# Patient Record
Sex: FEMALE | Race: WHITE | URBAN - METROPOLITAN AREA
[De-identification: names, ages, dates, MRNs, and addresses within clinical notes are randomized per-mention and may not be internally consistent; named-entity substitution may affect disease eponyms.]

---

## 2019-03-28 ENCOUNTER — ALLIED HEALTH/NURSE VISIT (OUTPATIENT)
Dept: FAMILY MEDICINE | Facility: OTHER | Age: 39
End: 2019-03-28
Payer: COMMERCIAL

## 2019-03-28 VITALS — SYSTOLIC BLOOD PRESSURE: 142 MMHG | DIASTOLIC BLOOD PRESSURE: 100 MMHG | HEART RATE: 60 BPM

## 2019-03-28 DIAGNOSIS — Z01.30 BP CHECK: Primary | ICD-10-CM

## 2019-03-28 PROCEDURE — 99207 ZZC NO CHARGE NURSE ONLY: CPT

## 2019-03-28 NOTE — PROGRESS NOTES
Laura Parrish is a 39 year old patient who comes in today for a Blood Pressure check.  Initial BP:  BP (!) 142/100   Pulse 60      60  Disposition: provider notified while patient in the clinic    Patient is feeling lightheaded and dizzy, states this has been going on since Sunday. Was seen by primary on [employer] on 3/25 and was diagnosed with vertigo. She states that she will mychart her provider to see what she suggests. Huddled with [facility], patient can take benadryl to help with dizziness and lightheadedness if due to a cold if she would like. Patient will need to make an appointment with her PCP or another provider in clinic as soon as she can to discuss high blood pressure. Patient made appointment for tomorrow morning at 7:40am

## 2019-03-29 ENCOUNTER — OFFICE VISIT (OUTPATIENT)
Dept: FAMILY MEDICINE | Facility: OTHER | Age: 39
End: 2019-03-29
Payer: COMMERCIAL

## 2019-03-29 VITALS
DIASTOLIC BLOOD PRESSURE: 84 MMHG | BODY MASS INDEX: 27.23 KG/M2 | OXYGEN SATURATION: 97 % | HEART RATE: 72 BPM | HEIGHT: 62 IN | WEIGHT: 148 LBS | TEMPERATURE: 98.2 F | RESPIRATION RATE: 16 BRPM | SYSTOLIC BLOOD PRESSURE: 124 MMHG

## 2019-03-29 DIAGNOSIS — H81.10 BENIGN PAROXYSMAL POSITIONAL VERTIGO, UNSPECIFIED LATERALITY: Primary | ICD-10-CM

## 2019-03-29 PROCEDURE — 99204 OFFICE O/P NEW MOD 45 MIN: CPT | Performed by: PHYSICIAN ASSISTANT

## 2019-03-29 RX ORDER — LEVOTHYROXINE SODIUM 125 UG/1
125 TABLET ORAL
COMMUNITY
Start: 2019-02-06

## 2019-03-29 ASSESSMENT — MIFFLIN-ST. JEOR: SCORE: 1291.63

## 2019-03-29 ASSESSMENT — PAIN SCALES - GENERAL: PAINLEVEL: NO PAIN (0)

## 2019-03-29 NOTE — PROGRESS NOTES
SUBJECTIVE:   Laura Parrish is a 39 year old female who presents to clinic today for the following health issues:      HPI   Dizziness and high blood pressure    Patient presents today to recheck dizziness and blood pressure. She reports symptoms started about 5 days ago. She reports extreme dizziness with changing positions. It is particularly bad when she lays down in bed and gets up in the morning. She reports yesterday she was cleaning a house - looking up and symptoms started again. She was seen at [facility] on 3/25/19. Her blood pressure was 150/92 at that time. She was told to follow-up for recheck. She denies headaches, vision changes, chest pain, shortness of breath, paresthesias or lower extremity swelling. Does not routinely exercise and eats quite a bit of fast food. She did quit smoking. Younger brother treated for HTN. No other family history of CAD disease.     Problem list and histories reviewed & adjusted, as indicated.  Additional history: as documented    Patient Active Problem List   Diagnosis     CARDIOVASCULAR SCREENING; LDL GOAL LESS THAN 160     Hypothyroidism     No past surgical history on file.    Social History     Tobacco Use     Smoking status: Former Smoker     Packs/day: 1.00     Types: Cigarettes     Smokeless tobacco: Never Used   Substance Use Topics     Alcohol use: Yes     Family History   Problem Relation Age of Onset     Hypertension Brother          Current Outpatient Medications   Medication Sig Dispense Refill     levothyroxine (SYNTHROID/LEVOTHROID) 125 MCG tablet Take 125 mcg by mouth       Allergies   Allergen Reactions     Amoxicillin      Ciprofloxacin Rash     BP Readings from Last 3 Encounters:   03/29/19 124/84   03/28/19 (!) 142/100   04/28/14 (!) 133/93    Wt Readings from Last 3 Encounters:   03/29/19 67.1 kg (148 lb)   04/01/10 61.7 kg (136 lb)   04/09/09 64 kg (141 lb)        ROS:  Constitutional, HEENT, cardiovascular, pulmonary, GI, , musculoskeletal, neuro,  "skin, endocrine and psych systems are negative, except as otherwise noted.    OBJECTIVE:     /84   Pulse 72   Temp 98.2  F (36.8  C)   Resp 16   Ht 1.562 m (5' 1.5\")   Wt 67.1 kg (148 lb)   LMP 03/17/2019 (Approximate)   SpO2 97%   BMI 27.51 kg/m    Body mass index is 27.51 kg/m .  GENERAL: healthy, alert and no distress  EYES: Eyes grossly normal to inspection, PERRL and conjunctivae and sclerae normal  HENT: ear canals and TM's normal, nose and mouth without ulcers or lesions  NECK: no adenopathy, no asymmetry, masses, or scars and thyroid normal to palpation  RESP: lungs clear to auscultation - no rales, rhonchi or wheezes  CV: regular rate and rhythm, normal S1 S2, no S3 or S4, no murmur, click or rub, no peripheral edema and peripheral pulses strong  ABDOMEN: soft, nontender, no hepatosplenomegaly, no masses and bowel sounds normal  SKIN: no suspicious lesions or rashes  NEURO: Normal strength and tone, sensory exam grossly normal, proprioception normal, mentation intact, speech normal and cranial nerves 2-12 intact  PSYCH: mentation appears normal, affect normal/bright    Diagnostic Test Results:  none     ASSESSMENT/PLAN:     1. Benign paroxysmal positional vertigo, unspecified laterality  Blood pressure is well controlled today. Symptoms very consistent with BPPV. Referral placed to PT at this time. We discussed monitoring of blood pressure and diet/exercise changes. Patient will follow-up if symptoms persist or new symptoms develop.   - PHYSICAL THERAPY REFERRAL; Future    The patient indicates understanding of these issues and agrees with the plan.    Maggi Montaño PA-C  [facility]  "

## 2019-06-27 ENCOUNTER — APPOINTMENT (OUTPATIENT)
Dept: CT IMAGING | Facility: CLINIC | Age: 39
End: 2019-06-27
Attending: PHYSICIAN ASSISTANT
Payer: COMMERCIAL

## 2019-06-27 ENCOUNTER — HOSPITAL ENCOUNTER (EMERGENCY)
Facility: CLINIC | Age: 39
Discharge: HOME | End: 2019-06-27
Attending: PHYSICIAN ASSISTANT | Admitting: PHYSICIAN ASSISTANT
Payer: COMMERCIAL

## 2019-06-27 VITALS
WEIGHT: 155 LBS | DIASTOLIC BLOOD PRESSURE: 91 MMHG | SYSTOLIC BLOOD PRESSURE: 134 MMHG | RESPIRATION RATE: 15 BRPM | HEART RATE: 78 BPM | BODY MASS INDEX: 28.81 KG/M2 | TEMPERATURE: 98.1 F | OXYGEN SATURATION: 98 %

## 2019-06-27 DIAGNOSIS — E86.0 DEHYDRATION: ICD-10-CM

## 2019-06-27 DIAGNOSIS — S01.01XA SCALP LACERATION: ICD-10-CM

## 2019-06-27 DIAGNOSIS — R55 SYNCOPE: ICD-10-CM

## 2019-06-27 DIAGNOSIS — S09.8XXA BLUNT HEAD TRAUMA: ICD-10-CM

## 2019-06-27 LAB
ALBUMIN SERPL-MCNC: 3.9 G/DL (ref 3.4–5)
ALBUMIN UR-MCNC: NEGATIVE MG/DL
ALP SERPL-CCNC: 50 U/L (ref 40–150)
ALT SERPL W P-5'-P-CCNC: 20 U/L (ref 0–50)
AMPHETAMINES UR QL: NOT DETECTED NG/ML
ANION GAP SERPL CALCULATED.3IONS-SCNC: 6 MMOL/L (ref 3–14)
APPEARANCE UR: ABNORMAL
AST SERPL W P-5'-P-CCNC: 15 U/L (ref 0–45)
BARBITURATES UR QL SCN: NOT DETECTED NG/ML
BASOPHILS # BLD AUTO: 0.1 10E9/L (ref 0–0.2)
BASOPHILS NFR BLD AUTO: 0.9 %
BENZODIAZ UR QL SCN: NOT DETECTED NG/ML
BILIRUB SERPL-MCNC: 0.4 MG/DL (ref 0.2–1.3)
BILIRUB UR QL STRIP: NEGATIVE
BUN SERPL-MCNC: 10 MG/DL (ref 7–30)
BUPRENORPHINE UR QL: NOT DETECTED NG/ML
CALCIUM SERPL-MCNC: 8.5 MG/DL (ref 8.5–10.1)
CANNABINOIDS UR QL: NOT DETECTED NG/ML
CHLORIDE SERPL-SCNC: 109 MMOL/L (ref 94–109)
CO2 SERPL-SCNC: 25 MMOL/L (ref 20–32)
COCAINE UR QL SCN: NOT DETECTED NG/ML
COLOR UR AUTO: YELLOW
CREAT SERPL-MCNC: 0.85 MG/DL (ref 0.52–1.04)
D-METHAMPHET UR QL: NOT DETECTED NG/ML
DIFFERENTIAL METHOD BLD: ABNORMAL
EOSINOPHIL NFR BLD AUTO: 1.5 %
ERYTHROCYTE [DISTWIDTH] IN BLOOD BY AUTOMATED COUNT: 12.5 % (ref 10–15)
ETHANOL SERPL-MCNC: <0.01 G/DL
GFR SERPL CREATININE-BSD FRML MDRD: 86 ML/MIN/{1.73_M2}
GLUCOSE SERPL-MCNC: 125 MG/DL (ref 70–99)
GLUCOSE UR STRIP-MCNC: NEGATIVE MG/DL
HCG UR QL: NEGATIVE
HCT VFR BLD AUTO: 39.8 % (ref 35–47)
HGB BLD-MCNC: 13.5 G/DL (ref 11.7–15.7)
HGB UR QL STRIP: ABNORMAL
HYALINE CASTS #/AREA URNS LPF: 1 /LPF (ref 0–2)
IMM GRANULOCYTES # BLD: 0.1 10E9/L (ref 0–0.4)
IMM GRANULOCYTES NFR BLD: 0.9 %
KETONES UR STRIP-MCNC: 20 MG/DL
LEUKOCYTE ESTERASE UR QL STRIP: NEGATIVE
LIPASE SERPL-CCNC: 70 U/L (ref 73–393)
LYMPHOCYTES # BLD AUTO: 1.8 10E9/L (ref 0.8–5.3)
LYMPHOCYTES NFR BLD AUTO: 18.6 %
MCH RBC QN AUTO: 33.4 PG (ref 26.5–33)
MCHC RBC AUTO-ENTMCNC: 33.9 G/DL (ref 31.5–36.5)
MCV RBC AUTO: 99 FL (ref 78–100)
METHADONE UR QL SCN: NOT DETECTED NG/ML
MONOCYTES # BLD AUTO: 0.6 10E9/L (ref 0–1.3)
MONOCYTES NFR BLD AUTO: 6.5 %
MUCOUS THREADS #/AREA URNS LPF: PRESENT /LPF
NEUTROPHILS # BLD AUTO: 6.9 10E9/L (ref 1.6–8.3)
NEUTROPHILS NFR BLD AUTO: 71.6 %
NITRATE UR QL: NEGATIVE
NRBC # BLD AUTO: 0 10*3/UL
NRBC BLD AUTO-RTO: 0 /100
OPIATES UR QL SCN: NOT DETECTED NG/ML
OXYCODONE UR QL SCN: NOT DETECTED NG/ML
PCP UR QL SCN: NOT DETECTED NG/ML
PH UR STRIP: 5 PH (ref 5–7)
PLATELET # BLD AUTO: 246 10E9/L (ref 150–450)
POTASSIUM SERPL-SCNC: 3.4 MMOL/L (ref 3.4–5.3)
PROPOXYPH UR QL: NOT DETECTED NG/ML
PROT SERPL-MCNC: 6.9 G/DL (ref 6.8–8.8)
RBC # BLD AUTO: 4.04 10E12/L (ref 3.8–5.2)
RBC #/AREA URNS AUTO: 1 /HPF (ref 0–2)
SODIUM SERPL-SCNC: 140 MMOL/L (ref 133–144)
SOURCE: ABNORMAL
SP GR UR STRIP: 1.01 (ref 1–1.03)
SQUAMOUS #/AREA URNS AUTO: 5 /HPF (ref 0–1)
TRICYCLICS UR QL SCN: NOT DETECTED NG/ML
UROBILINOGEN UR STRIP-MCNC: 0 MG/DL (ref 0–2)
WBC # BLD AUTO: 9.7 10E9/L (ref 4–11)
WBC #/AREA URNS AUTO: 6 /HPF (ref 0–5)

## 2019-06-27 PROCEDURE — 81001 URINALYSIS AUTO W/SCOPE: CPT | Performed by: PHYSICIAN ASSISTANT

## 2019-06-27 PROCEDURE — 12001 RPR S/N/AX/GEN/TRNK 2.5CM/<: CPT

## 2019-06-27 PROCEDURE — 96374 THER/PROPH/DIAG INJ IV PUSH: CPT

## 2019-06-27 PROCEDURE — 99285 EMERGENCY DEPT VISIT HI MDM: CPT | Mod: 25

## 2019-06-27 PROCEDURE — 12001 RPR S/N/AX/GEN/TRNK 2.5CM/<: CPT | Mod: Z6 | Performed by: PHYSICIAN ASSISTANT

## 2019-06-27 PROCEDURE — 81025 URINE PREGNANCY TEST: CPT | Performed by: PHYSICIAN ASSISTANT

## 2019-06-27 PROCEDURE — 80306 DRUG TEST PRSMV INSTRMNT: CPT | Performed by: PHYSICIAN ASSISTANT

## 2019-06-27 PROCEDURE — 80053 COMPREHEN METABOLIC PANEL: CPT | Performed by: PHYSICIAN ASSISTANT

## 2019-06-27 PROCEDURE — 25000128 H RX IP 250 OP 636: Performed by: PHYSICIAN ASSISTANT

## 2019-06-27 PROCEDURE — 85025 COMPLETE CBC W/AUTO DIFF WBC: CPT | Performed by: PHYSICIAN ASSISTANT

## 2019-06-27 PROCEDURE — 70450 CT HEAD/BRAIN W/O DYE: CPT

## 2019-06-27 PROCEDURE — 93010 ELECTROCARDIOGRAM REPORT: CPT | Mod: Z6 | Performed by: PHYSICIAN ASSISTANT

## 2019-06-27 PROCEDURE — 80320 DRUG SCREEN QUANTALCOHOLS: CPT | Performed by: PHYSICIAN ASSISTANT

## 2019-06-27 PROCEDURE — 25000125 ZZHC RX 250: Performed by: PHYSICIAN ASSISTANT

## 2019-06-27 PROCEDURE — 83690 ASSAY OF LIPASE: CPT | Performed by: PHYSICIAN ASSISTANT

## 2019-06-27 PROCEDURE — 99285 EMERGENCY DEPT VISIT HI MDM: CPT | Mod: 25 | Performed by: PHYSICIAN ASSISTANT

## 2019-06-27 PROCEDURE — 96361 HYDRATE IV INFUSION ADD-ON: CPT

## 2019-06-27 PROCEDURE — 93005 ELECTROCARDIOGRAM TRACING: CPT

## 2019-06-27 RX ORDER — KETOROLAC TROMETHAMINE 30 MG/ML
30 INJECTION, SOLUTION INTRAMUSCULAR; INTRAVENOUS ONCE
Status: COMPLETED | OUTPATIENT
Start: 2019-06-27 | End: 2019-06-27

## 2019-06-27 RX ORDER — SODIUM CHLORIDE 9 MG/ML
1000 INJECTION, SOLUTION INTRAVENOUS CONTINUOUS
Status: DISCONTINUED | OUTPATIENT
Start: 2019-06-27 | End: 2019-06-27 | Stop reason: HOSPADM

## 2019-06-27 RX ADMIN — SODIUM CHLORIDE 1000 ML: 9 INJECTION, SOLUTION INTRAVENOUS at 14:15

## 2019-06-27 RX ADMIN — KETOROLAC TROMETHAMINE 30 MG: 30 INJECTION, SOLUTION INTRAMUSCULAR at 15:14

## 2019-06-27 RX ADMIN — Medication 3 ML: at 15:01

## 2019-06-27 RX ADMIN — SODIUM CHLORIDE 1000 ML: 9 INJECTION, SOLUTION INTRAVENOUS at 15:30

## 2019-06-27 NOTE — LETTER
June 27, 2019      To Whom It May Concern:      Laura Parrish was seen in our Emergency Department today, 06/27/19.  I expect her condition to improve over the next 5-7 days.  Please excuse her from work until she is released to do so.  She is scheduled for a recheck appointment early next week to re-evaluate her condition.        Sincerely,            Lucian Reed PA-C

## 2019-06-27 NOTE — ED PROVIDER NOTES
"  History     Chief Complaint   Patient presents with     Fall     The history is provided by the EMS personnel and the patient.     Laura Parrish is a 39 year old female who presents to the emergency department via EMS for a fall. Patient is brought to the ED after having an unwitnessed fall at [employer]. Per EMS she was found in the aisle by another customer \"unresponsive and twitching\". She was awake when EMS arrived Patient only remembers going into [employer] and feeling dizzy, doesn't remember falling. She did strike her head and complains of pain in the back of her head, nausea and has an abrasion on her tongue. She denies any loss of bowel or bladder. She denies vomiting blood, black stools, dysuria, night sweats or weight loss. Patient reports having chronic diarrhea 3-4 times a day since her gallbladder was removed. Her LMP was about 3 weeks ago. She does get her menstrual cycle once a month, bleeds for 6 days with no heavy bleeding. She does not drink a lot of water, states \"I hate water\" and eats for the first time during the day around 1300, which is usual for patient. She denies ever having these symptoms or passing out before, history of seizures or heart problems. She reports drinking at least 4 beers a day when she gets home at night. She denies having any alcohol yet today. She denies chance of pregnancy due to having a tubal. She denies any use of street drugs.    Allergies:  Allergies   Allergen Reactions     Amoxicillin      Ciprofloxacin Rash       Problem List:    Patient Active Problem List    Diagnosis Date Noted     Hypothyroidism 07/25/2014     Priority: Medium     CARDIOVASCULAR SCREENING; LDL GOAL LESS THAN 160 10/31/2010     Priority: Medium        Past Medical History:    History reviewed. No pertinent past medical history.    Past Surgical History:    History reviewed. No pertinent surgical history.    Family History:    Family History   Problem Relation Age of Onset     Hypertension " Brother        Social History:  Marital Status:   [2]  Social History     Tobacco Use     Smoking status: Former Smoker     Packs/day: 1.00     Types: Cigarettes     Smokeless tobacco: Never Used   Substance Use Topics     Alcohol use: Yes     Alcohol/week: 2.4 oz     Types: 4 Cans of beer per week     Comment: Last use of ETOH evening.      Drug use: No        Medications:      levothyroxine (SYNTHROID/LEVOTHROID) 125 MCG tablet         Review of Systems   All other systems reviewed and are negative.      Physical Exam   BP: (!) 129/91  Pulse: 87  Heart Rate: 84  Temp: 98.1  F (36.7  C)  Resp: 16  Weight: 70.3 kg (155 lb)(bed scale)  SpO2: 98 %      Physical Exam   Constitutional: She is oriented to person, place, and time. She appears well-developed and well-nourished. No distress.   HENT:   Right Ear: External ear normal.   Left Ear: External ear normal.   Nose: Nose normal.   Mouth/Throat: Oropharynx is clear and moist. No oropharyngeal exudate.   Left parietal aspect of the scalp with a 2.5 cm burst type T-shaped laceration without skull defect palpated.  Tender to palpation.  Some minor bleeding.  Some superficial fat is coming from the wound as well.  Right side of the tongue with evidence for abrasion, but no tongue biting.   Eyes: Pupils are equal, round, and reactive to light. Conjunctivae and EOM are normal. Right eye exhibits no discharge. Left eye exhibits no discharge. No scleral icterus.   Neck: Normal range of motion. Neck supple. No thyromegaly present.   Cardiovascular: Normal rate, regular rhythm, normal heart sounds and intact distal pulses. Exam reveals no friction rub.   No murmur heard.  Pulmonary/Chest: Effort normal and breath sounds normal. No respiratory distress. She has no wheezes. She has no rales. She exhibits no tenderness.   Abdominal: Soft. Bowel sounds are normal. She exhibits no distension and no mass. There is no tenderness. There is no rebound and no guarding.  "  Musculoskeletal: Normal range of motion. She exhibits no edema, tenderness or deformity.   Lymphadenopathy:     She has no cervical adenopathy.   Neurological: She is alert and oriented to person, place, and time.   Neuro:  Cranial nerves II-XII grossly intact.  Romberg is steady.  Gait WNL's.  Cerebellar testing is WNL's.  Sensation is intact to light touch throughout.  Bicep, brachioradialis, patellar, and achilles DTR's 2/4 without clonus.  No neurological abnormality identified.    Skin: Skin is warm and dry. Capillary refill takes less than 2 seconds. No rash noted. She is not diaphoretic. No erythema.   Psychiatric: She has a normal mood and affect. Her behavior is normal. Thought content normal.   Nursing note and vitals reviewed.      ED Course        Laceration repair  Date/Time: 6/28/2019 12:52 AM  Performed by: Lucian Reed PA-C  Authorized by: Lcuian Reed PA-C   Consent: Verbal consent obtained.  Risks and benefits: risks, benefits and alternatives were discussed  Consent given by: patient  Patient identity confirmed: arm band and verbally with patient  Time out: Immediately prior to procedure a \"time out\" was called to verify the correct patient, procedure, equipment, support staff and site/side marked as required.  Body area: head/neck  Location details: scalp  Laceration length: 2.5 cm  Foreign bodies: no foreign bodies  Anesthesia method: LET.    Anesthesia:  Local Anesthetic: LET (lido,epi,tetracaine)  Preparation: Patient was prepped and draped in the usual sterile fashion.  Irrigation solution: saline  Irrigation method: syringe and tap  Amount of cleaning: extensive  Debridement: none  Degree of undermining: none  Skin closure: 4-0 nylon  Number of sutures: 5  Technique: simple  Approximation: close  Approximation difficulty: simple  Dressing: antibiotic ointment  Patient tolerance: Patient tolerated the procedure well with no immediate complications                     EKG " Interpretation:      Interpreted by Lucain Reed  Time reviewed: 1450  Symptoms at time of EKG: none   Rhythm: normal sinus   Rate: normal  Axis: normal  Ectopy: none  Conduction: normal  ST Segments/ T Waves: No ST-T wave changes  Q Waves: none  Comparison to prior: No old EKG available    Clinical Impression: normal EKG          Critical Care time:  none               Results for orders placed or performed during the hospital encounter of 06/27/19 (from the past 24 hour(s))   CBC with platelets differential   Result Value Ref Range    WBC 9.7 4.0 - 11.0 10e9/L    RBC Count 4.04 3.8 - 5.2 10e12/L    Hemoglobin 13.5 11.7 - 15.7 g/dL    Hematocrit 39.8 35.0 - 47.0 %    MCV 99 78 - 100 fl    MCH 33.4 (H) 26.5 - 33.0 pg    MCHC 33.9 31.5 - 36.5 g/dL    RDW 12.5 10.0 - 15.0 %    Platelet Count 246 150 - 450 10e9/L    Diff Method Automated Method     % Neutrophils 71.6 %    % Lymphocytes 18.6 %    % Monocytes 6.5 %    % Eosinophils 1.5 %    % Basophils 0.9 %    % Immature Granulocytes 0.9 %    Nucleated RBCs 0 0 /100    Absolute Neutrophil 6.9 1.6 - 8.3 10e9/L    Absolute Lymphocytes 1.8 0.8 - 5.3 10e9/L    Absolute Monocytes 0.6 0.0 - 1.3 10e9/L    Absolute Basophils 0.1 0.0 - 0.2 10e9/L    Abs Immature Granulocytes 0.1 0 - 0.4 10e9/L    Absolute Nucleated RBC 0.0    Comprehensive metabolic panel   Result Value Ref Range    Sodium 140 133 - 144 mmol/L    Potassium 3.4 3.4 - 5.3 mmol/L    Chloride 109 94 - 109 mmol/L    Carbon Dioxide 25 20 - 32 mmol/L    Anion Gap 6 3 - 14 mmol/L    Glucose 125 (H) 70 - 99 mg/dL    Urea Nitrogen 10 7 - 30 mg/dL    Creatinine 0.85 0.52 - 1.04 mg/dL    GFR Estimate 86 >60 mL/min/[1.73_m2]    GFR Estimate If Black >90 >60 mL/min/[1.73_m2]    Calcium 8.5 8.5 - 10.1 mg/dL    Bilirubin Total 0.4 0.2 - 1.3 mg/dL    Albumin 3.9 3.4 - 5.0 g/dL    Protein Total 6.9 6.8 - 8.8 g/dL    Alkaline Phosphatase 50 40 - 150 U/L    ALT 20 0 - 50 U/L    AST 15 0 - 45 U/L   Lipase   Result Value Ref  Range    Lipase 70 (L) 73 - 393 U/L   Alcohol ethyl   Result Value Ref Range    Ethanol g/dL <0.01 <0.01 g/dL   CT Head w/o Contrast    Narrative    CT SCAN OF THE HEAD WITHOUT CONTRAST   6/27/2019 2:35 PM     HISTORY: Syncope versus seizure, posterior head trauma and pain.    TECHNIQUE: Axial images of the head and coronal reformations without  IV contrast material. Radiation dose for this scan was reduced using  automated exposure control, adjustment of the mA and/or kV according  to patient size, or iterative reconstruction technique.    COMPARISON: None.    FINDINGS: There is some extracalvarial soft tissue swelling over the  left lateral anterior parietal region. There is no evidence for any  underlying fracture or any intracranial hemorrhage. Ventricles and  subarachnoid spaces are normal. Brain parenchyma is normal. There is  no evidence for acute infarct or mass effect. Moderate mucosal  thickening is noted in the right maxillary sinus. The mastoid air  cells are clear.      Impression    IMPRESSION: Left lateral anterior parietal scalp soft tissue swelling.  No evidence for any underlying fracture or any intracranial  hemorrhage.    STEPHANIE GOODMAN MD   HCG qualitative urine (UPT)   Result Value Ref Range    HCG Qual Urine Negative NEG^Negative   UA reflex to Microscopic and Culture   Result Value Ref Range    Color Urine Yellow     Appearance Urine Slightly Cloudy     Glucose Urine Negative NEG^Negative mg/dL    Bilirubin Urine Negative NEG^Negative    Ketones Urine 20 (A) NEG^Negative mg/dL    Specific Gravity Urine 1.013 1.003 - 1.035    Blood Urine Small (A) NEG^Negative    pH Urine 5.0 5.0 - 7.0 pH    Protein Albumin Urine Negative NEG^Negative mg/dL    Urobilinogen mg/dL 0.0 0.0 - 2.0 mg/dL    Nitrite Urine Negative NEG^Negative    Leukocyte Esterase Urine Negative NEG^Negative    Source Unspecified Urine     RBC Urine 1 0 - 2 /HPF    WBC Urine 6 (H) 0 - 5 /HPF    Squamous Epithelial /HPF Urine 5 (H) 0 -  1 /HPF    Mucous Urine Present (A) NEG^Negative /LPF    Hyaline Casts 1 0 - 2 /LPF   Urine Drugs of Abuse Screen Panel 13   Result Value Ref Range    Cannabinoids (35-odq-3-carboxy-9-THC) Not Detected NDET^Not Detected ng/mL    Phencyclidine (Phencyclidine) Not Detected NDET^Not Detected ng/mL    Cocaine (Benzoylecgonine) Not Detected NDET^Not Detected ng/mL    Methamphetamine (d-Methamphetamine) Not Detected NDET^Not Detected ng/mL    Opiates (Morphine) Not Detected NDET^Not Detected ng/mL    Amphetamine (d-Amphetamine) Not Detected NDET^Not Detected ng/mL    Benzodiazepines (Nordiazepam) Not Detected NDET^Not Detected ng/mL    Tricyclic Antidepressants (Desipramine) Not Detected NDET^Not Detected ng/mL    Methadone (Methadone) Not Detected NDET^Not Detected ng/mL    Barbiturates (Butalbital) Not Detected NDET^Not Detected ng/mL    Oxycodone (Oxycodone) Not Detected NDET^Not Detected ng/mL    Propoxyphene (Norpropoxyphene) Not Detected NDET^Not Detected ng/mL    Buprenorphine (Buprenorphine) Not Detected NDET^Not Detected ng/mL       Medications   0.9% sodium chloride BOLUS (0 mLs Intravenous Stopped 6/27/19 1518)   lidocaine/EPINEPHrine/tetracaine (LET) solution KIT 3 mL (3 mLs Topical Given 6/27/19 1501)   ketorolac (TORADOL) injection 30 mg (30 mg Intravenous Given 6/27/19 1514)   0.9% sodium chloride BOLUS (0 mLs Intravenous Stopped 6/27/19 1641)       Assessments & Plan (with Medical Decision Making)     Syncope  Scalp laceration  Blunt head trauma  Dehydration     39 year old female presents for evaluation of a syncopal event that happened at a local grocery store.  Patient does not recall the event, but does remember waking up.  Bystanders noted that the patient fell to the floor and started to twitch a little bit, but was arousable afterwards.  There is no loss of bowel/bladder function.  No tonic-clonic movements.  EMS was contacted.  Blood sugar was not done in the field.  She is brought to the ED for  evaluation.  Patient denies ever having this happen in the past.  She denies any palpitations, lightheadedness, fatigue, or other symptoms preceding the incident.  She generally drinks 1 to 2 cups of coffee every morning, but does not eat anything until late afternoon.  Her first nourishment is at about 3-4 PM.  She does this as she does not want to have a bowel movement when she is at work.  She has had troubles with dumping syndrome ever since having her cholecystectomy.  At the time of her ED presentation, the patient notes left-sided upper scalp discomfort and a headache, but no other symptoms.  She notes some soreness to the right lateral aspect of her tongue.  On exam blood pressure 134/91, pulse 78, temperature 98.1, and oxygen saturation 98% on room air.  Patient has a 2.5 cm T-shaped burst type laceration to the left parietal aspect of the scalp without skull depression or other abnormality.  Neurological exam completely intact.  She does not appear to be postictal.  Right lateral tongue with an abrasion but no active bleeding or sign of laceration.  Remainder the exam completely normal.  EKG done upon arrival with no acute ST or T wave change.  No preexcitation.  No prior EKG for comparison.  IV was established and she was given 2 L of IV normal saline for rehydration purposes.  She did not urinate to the very end of her visit, and therefore I think that dehydration certainly plays a role here.  Labs with normal CBC.  Hemoglobin stable at 13.5, white blood cell count 9700, and platelet level normal at 246,000.  Comprehensive metabolic panel with a blood sugar of 125, but other levels are completely normal.  EtOH level undetectable.  hCG negative and urinalysis without evidence for infection.  She had small blood on microscopic exam but only 1 red cell on microscopic exam.  Squamous epithelial cells noted.  This appears to represent a contaminated specimen.  Urine tox screen negative.  CT of the head  without evidence for skull fracture or intracranial bleeding.  I reviewed the CT as well especially given the extent of the laceration noted.  I do not see evidence for skull fracture either.  I attempted stapling of the wound, but this did not approximate the wound edges well.  Therefore, I remove the one staple and then placed #five 4-0 Ethilon sutures with good approximation of the wound edges.  Long tags were left on the sutures to allow for easier suture removal.   bacitracin ointment applied.  Wound care measures discussed.  Patient's symptoms do not appear to be consistent with seizure disorder.  She did not have any tonic-clonic movements or loss of bowel/bladder unction.  It does not appear that she had a true postictal state either.  A blood sugar was not done on scene, and we did not get a blood sugar done until almost 1.5 hours after the incident.  I am concerned that hypoglycemia could have been an issue with her 2 cups of coffee intake and no nourishment this morning.  We had a long conversation about proper hydration measures and also getting some calories in her body in the morning.  I provided some ideas to provide this nourishment yet not cause diarrhea given her dumping syndrome.  Given her level of blunt head trauma, I am concerned that she could also have suffered a concussion.  To play it safe, I am going to take her out of work, physical activity, watching TV, reading books, etc. until released to do so.  Appointment set up with sports medicine in 5 days.  Note provided for work.  Importance of proper hydration reviewed.  Strict return instructions reviewed with the patient and her .  She was in agreement with this plan and was suitable for discharge in much improved condition.     I have reviewed the nursing notes.    I have reviewed the findings, diagnosis, plan and need for follow up with the patient.          Medication List      There are no discharge medications for this visit.          Final diagnoses:   Syncope   Scalp laceration   Blunt head trauma   Dehydration     This document serves as a record of services personally performed by Lucian Reed PA-C. It was created on their behalf by Roberta Christie, a trained medical scribe. The creation of this record is based on the provider's personal observations and the statements of the patient. This document has been checked and approved by the attending provider.    Note: Chart documentation done in part with Dragon Voice Recognition software. Although reviewed after completion, some word and grammatical errors may remain.    6/27/2019   Lucian Reed PA-C   [facility] EMERGENCY DEPARTMENT     Lucian Reed PA-C  06/28/19 0100

## 2019-06-27 NOTE — DISCHARGE INSTRUCTIONS
It was a pleasure working with you today!  I hope your condition improves rapidly!     Thankfully, your work-up returned within normal limits.  I am concerned that you likely suffered a concussion given your blunt head trauma.  For that reason, I do not want to working, exercising, watching TV, reading books, etc. until released to do so.  Please attend your recheck appointment prior to returning to work to ensure that it is safe to do so.  Please make sure that you stay hydrated like we discussed.  Please work on providing some nourishment in the morning.  Consider a shake or something simple to at least get something into your system.  I am concerned that you maybe had a low blood sugar event today.  Your stitches in the scalp will need to be removed in 8 days.

## 2019-06-27 NOTE — ED AVS SNAPSHOT
[facility] Emergency Department  [address] [place] [address]  Phone:  [phone]  Fax:  [phone]                                    Laura Parrish   MRN: [id number]    Department:  [facility] Emergency Department   Date of Visit:  6/27/2019           After Visit Summary Signature Page    I have received my discharge instructions, and my questions have been answered. I have discussed any challenges I see with this plan with the nurse or doctor.    ..........................................................................................................................................  Patient/Patient Representative Signature      ..........................................................................................................................................  Patient Representative Print Name and Relationship to Patient    ..................................................               ................................................  Date                                   Time    ..........................................................................................................................................  Reviewed by Signature/Title    ...................................................              ..............................................  Date                                               Time          22EPIC Rev 08/18

## 2019-06-27 NOTE — ED TRIAGE NOTES
"Patient brought to ED via EMS from [facility] after having an unwitnessed fall. She was found in the aisle by another customer \"unresponsive\". María Pineda RN  "

## 2019-06-27 NOTE — ED NOTES
"Patient reports daily drinking \"a lot\". She states she drinks at least 4 beers a day and has had none since last evening. María Pineda RN  "

## 2019-07-01 ENCOUNTER — OFFICE VISIT (OUTPATIENT)
Dept: ORTHOPEDICS | Facility: OTHER | Age: 39
End: 2019-07-01
Payer: COMMERCIAL

## 2019-07-01 VITALS
HEIGHT: 62 IN | BODY MASS INDEX: 28.52 KG/M2 | DIASTOLIC BLOOD PRESSURE: 94 MMHG | SYSTOLIC BLOOD PRESSURE: 144 MMHG | WEIGHT: 155 LBS

## 2019-07-01 DIAGNOSIS — S09.90XD CLOSED HEAD INJURY, SUBSEQUENT ENCOUNTER: Primary | ICD-10-CM

## 2019-07-01 DIAGNOSIS — R42 VERTIGO: ICD-10-CM

## 2019-07-01 DIAGNOSIS — R55 SYNCOPE AND COLLAPSE: ICD-10-CM

## 2019-07-01 PROCEDURE — 99204 OFFICE O/P NEW MOD 45 MIN: CPT | Performed by: PHYSICAL MEDICINE & REHABILITATION

## 2019-07-01 ASSESSMENT — MIFFLIN-ST. JEOR: SCORE: 1323.39

## 2019-07-01 NOTE — LETTER
7/1/2019         RE: Laura Parrish  [id number] [facility] [address]        Dear Colleague,    Thank you for referring your patient, Laura Parrish, to the [facility]. Please see a copy of my visit note below.    Sports Medicine Clinic Report:    CC: Head Injury    SUBJECTIVE:  Laura Parrish is a 39 year old female who is seen as an ER referral for evaluation of a possible concussion that occurred 6/27/19 or 4 days ago.   Mechanism of injury: fainted at [employer] and hit the back of her head. She does not remember falling but remembers right before the fall having a change in vision. She then remembers waking up and a  being in front of her on the phone. She was transported by EMS and had stitches in her head.   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    Since your injury, have you continued with your normal cognitive activity (text, computer, school):  Light phone use - doesn't really bother her. Went grocery shopping on Friday and did not have an increase in symptoms.     She notes that she has had vertigo in the past and was recommended to go to physical therapy.  She notes that she did not go to PT because she was doing better. Now she feels like the symptoms are the same however now they are constant.     Concussion Symptom Assessment      Headache or Pressure In Head: 4 - moderate to severe  Upset Stomach or Throwing Up: 0 - none  Problems with Balance: 3 - moderate  Feeling Dizzy: 4 - moderate to severe  Sensitivity to Light: 0 - none  Sensitivity to Noise: 0 - none  Mood Changes: 0 - none  Feeling sluggish, hazy, or foggy: 1 - mild  Trouble Concentrating, Lack of Focus: 2 - mild to moderate  Motion  "Sickness: 0 - none  Vision Changes: 0 - none  Memory Problems: 0 - none  Feeling Confused: 0 - none  Neck Pain: 4 - moderate to severe  Trouble Sleepin - mild to moderate  Total Number of Symptoms: 7  Symptom Severity Score: 20      Sleep: Difficulty staying asleep    Academic Issues:  N/A    Past pertinent history: Migraines: no     Depression: no     Anxiety: no     Learning disability: no     ADHD: no     Past History of concussions: No      Patient's past medical, surgical, social and family histories reviewed:  No significant medical history      REVIEW OF SYSTEMS:  Skin: no bruising, no swelling  Musculoskeletal: as above  Neurologic: no numbness, paresthesias  Remainder of review of systems is negative including constitutional, CV, pulmonary, GI, except as noted in HPI or medical history.    OBJECTIVE:  BP (!) 144/94   Ht 1.562 m (5' 1.5\")   Wt 70.3 kg (155 lb)   BMI 28.81 kg/m       EXAM:  General: healthy, alert and in no distress    Head: Well healing laceration with sutures, posterior scalp  Neck:  Full ROM without pain  Eyes: no scleral icterus or conjunctival erythema   Oropharynx:  Mucous membranes moist  Skin: no erythema, ecchymosis, petechiae, or jaundice  CV: regular rhythm by palpation, 2+ distal pulses, no pedal edema    Resp: normal respiratory effort without conversational dyspnea   Psych: normal mood and affect    Gait: Non-antalgic, appropriate coordination and balance   Neuro: normal light touch sensory exam of the extremities. Motor strength as noted below    NEUROLOGIC:  Cranial Nerves 2-12:  intact  EOMI:Yes  Nystagmus: No  Coordination:  Finger to Nose: normal    Heel to Shin: normal    Rapid Alternating Movements: normal  Balance Testing: Romberg: normal   Backward Tandem: normal   Single-leg stance: normal  Advanced Balance Testing:     Single leg Balance with simultaneous cognitive test : normal  Modified MIHIR:     Firm   Double Leg 0   Single Leg (Non-Dominant) 3   Tandem " (Non-Dominant in back) 2                   Total: 5         Vestibular/Ocular Motor Test:     Not Tested Headache Dizziness Nausea Fogginess Comments   Baseline  7 0 0 0 Headache feels like it is at the base of the left side of the skull/neck    Smooth Pursuits  7 0 0 0    Saccades-Horizontal  7 0 0 0    Saccades-Vertical  7 0 0 0    Convergence (Near Point)  7 0 0 0 (Near Point in CM)  Measure 1: 2    Measure 2: 4  Measure 3 3   VOR Vertical  7 0 0 0    VOR Horizontal  7 0 0 0    Visual Motion Sensitivity Test  7 0 0 0               Cognitive:  Immediate object recall:   4 Object Recall at 5 minutes:3/  Reverse months of the year:   Spell world backwards: Able  Backwards number strin numbers   4-9-3                  Alternate:  6-2-9   3-8-1-4   3-2-7-9    6-2-9-7-1   1-5-2-8-6    7-1-8-4-6-2   5-3-9-1-4-8       Impact Testing Scores: ImPACT Testing not performed    Strength:  Shoulder shrug (C5):5/5  Deltoid (C5): 5/5  Bicep (C6):5/5  Wrist Extension (C6): 5/5  Tricep (C7):5/5  Wrist Flexion (C7): 5/5  Finger Flexion (C8/T1):5/5    Radiology:  Recent Results (from the past 744 hour(s))   CT Head w/o Contrast    Narrative    CT SCAN OF THE HEAD WITHOUT CONTRAST   2019 2:35 PM     HISTORY: Syncope versus seizure, posterior head trauma and pain.    TECHNIQUE: Axial images of the head and coronal reformations without  IV contrast material. Radiation dose for this scan was reduced using  automated exposure control, adjustment of the mA and/or kV according  to patient size, or iterative reconstruction technique.    COMPARISON: None.    FINDINGS: There is some extracalvarial soft tissue swelling over the  left lateral anterior parietal region. There is no evidence for any  underlying fracture or any intracranial hemorrhage. Ventricles and  subarachnoid spaces are normal. Brain parenchyma is normal. There is  no evidence for acute infarct or mass effect. Moderate mucosal  thickening is noted in the right  maxillary sinus. The mastoid air  cells are clear.      Impression    IMPRESSION: Left lateral anterior parietal scalp soft tissue swelling.  No evidence for any underlying fracture or any intracranial  hemorrhage.    STEPHANIE GOODMAN MD           ASSESSMENT:     Closed head injury, subsequent encounter  Vertigo  Syncope and collapse    PLAN:    -The differential for the etiology of Laura's syncopal episode is broad but includes dehydration, hypoglycemia, vertigo, cardiac (EKG normal), seizures (did not appear to be post ictal in ER per documentation), etc.  This was discussed extensively in the ER.  Head CT scan was negative for intracranial pathology.  She hit her head on the fall and required 5 sutures.  She was sent to me for evaluation of concussion.  In general, she does not feel much different now as compared to prior to the event, but she does note a headache and worsened vertigo.  She was supposed to go to physical therapy previously for her vertigo but her symptoms improved so she did not choose to go.  Her vertigo is worse now.  Explained to Laura that given all of this and that it is a subjective diagnosis, it is difficult to determine if this is truly a concussion.  However, will treat as such for now:  -No activities with a risk of falling or aggressive physical activity.  -Limit multimedia activity (i.e.texting, video games, computer work, and TV)  -No work at this time.  Letter provided.  -No driving.    -Physical therapy referral for vertigo.  Please do 5-6 days of exercises per week between formal sessions and the home exercises they provide.  -Rest physically and cognitively as much as possible. Avoid things that worsen symptoms.    -Follow Up: 1 week or sooner if symptoms fail to improve or worsen.  Please call with any questions or concerns.     -Laura to follow up with Primary Care provider regarding elevated blood pressure.      Elenita Crystal MD, [facility] [place] [facility]  [facility]        Again, thank you for allowing me to participate in the care of your patient.        Sincerely,        Hemalatha Crystal MD

## 2019-07-01 NOTE — LETTER
July 1, 2019      Laura Parrish  [id number] [facility] [address]        To Whom It May Concern:    Laura Parrish  was seen on 7/1/19 after a head injury.  She should not work at this time.  She will be seen on 7/8/19 in follow up.      Sincerely,        Hemalatha Crystal MD

## 2019-07-01 NOTE — PROGRESS NOTES
Sports Medicine Clinic Report:    CC: Head Injury    SUBJECTIVE:  Laura Parrish is a 39 year old female who is seen as an ER referral for evaluation of a possible concussion that occurred 19 or 4 days ago.   Mechanism of injury: fainted at [employer] and hit the back of her head. She does not remember falling but remembers right before the fall having a change in vision. She then remembers waking up and a  being in front of her on the phone. She was transported by EMS and had stitches in her head.   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    Since your injury, have you continued with your normal cognitive activity (text, computer, school):  Light phone use - doesn't really bother her. Went grocery shopping on Friday and did not have an increase in symptoms.     She notes that she has had vertigo in the past and was recommended to go to physical therapy.  She notes that she did not go to PT because she was doing better. Now she feels like the symptoms are the same however now they are constant.     Concussion Symptom Assessment      Headache or Pressure In Head: 4 - moderate to severe  Upset Stomach or Throwing Up: 0 - none  Problems with Balance: 3 - moderate  Feeling Dizzy: 4 - moderate to severe  Sensitivity to Light: 0 - none  Sensitivity to Noise: 0 - none  Mood Changes: 0 - none  Feeling sluggish, hazy, or foggy: 1 - mild  Trouble Concentrating, Lack of Focus: 2 - mild to moderate  Motion Sickness: 0 - none  Vision Changes: 0 - none  Memory Problems: 0 - none  Feeling Confused: 0 - none  Neck Pain: 4 - moderate to severe  Trouble Sleepin - mild to moderate  Total Number of Symptoms: 7  Symptom Severity Score:  "20      Sleep: Difficulty staying asleep    Academic Issues:  N/A    Past pertinent history: Migraines: no     Depression: no     Anxiety: no     Learning disability: no     ADHD: no     Past History of concussions: No      Patient's past medical, surgical, social and family histories reviewed:  No significant medical history      REVIEW OF SYSTEMS:  Skin: no bruising, no swelling  Musculoskeletal: as above  Neurologic: no numbness, paresthesias  Remainder of review of systems is negative including constitutional, CV, pulmonary, GI, except as noted in HPI or medical history.    OBJECTIVE:  BP (!) 144/94   Ht 1.562 m (5' 1.5\")   Wt 70.3 kg (155 lb)   BMI 28.81 kg/m      EXAM:  General: healthy, alert and in no distress    Head: Well healing laceration with sutures, posterior scalp  Neck:  Full ROM without pain  Eyes: no scleral icterus or conjunctival erythema   Oropharynx:  Mucous membranes moist  Skin: no erythema, ecchymosis, petechiae, or jaundice  CV: regular rhythm by palpation, 2+ distal pulses, no pedal edema    Resp: normal respiratory effort without conversational dyspnea   Psych: normal mood and affect    Gait: Non-antalgic, appropriate coordination and balance   Neuro: normal light touch sensory exam of the extremities. Motor strength as noted below    NEUROLOGIC:  Cranial Nerves 2-12:  intact  EOMI:Yes  Nystagmus: No  Coordination:  Finger to Nose: normal    Heel to Shin: normal    Rapid Alternating Movements: normal  Balance Testing: Romberg: normal   Backward Tandem: normal   Single-leg stance: normal  Advanced Balance Testing:     Single leg Balance with simultaneous cognitive test : normal  Modified MIHIR:     Firm   Double Leg 0   Single Leg (Non-Dominant) 3   Tandem (Non-Dominant in back) 2                   Total: 5         Vestibular/Ocular Motor Test:     Not Tested Headache Dizziness Nausea Fogginess Comments   Baseline  7 0 0 0 Headache feels like it is at the base of the left side of the " skull/neck    Smooth Pursuits  7 0 0 0    Saccades-Horizontal  7 0 0 0    Saccades-Vertical  7 0 0 0    Convergence (Near Point)  7 0 0 0 (Near Point in CM)  Measure 1: 2    Measure 2: 4  Measure 3 3   VOR Vertical  7 0 0 0    VOR Horizontal  7 0 0 0    Visual Motion Sensitivity Test  7 0 0 0               Cognitive:  Immediate object recall:   4 Object Recall at 5 minutes:3/  Reverse months of the year:   Spell world backwards: Able  Backwards number strin numbers   4-9-3                  Alternate:  6-2-9   3-8-1-4   3-2-7-9    6-2-9-7-1   1-5-2-8-6    7-1-8-4-6-2   5-3-9-1-4-8       Impact Testing Scores: ImPACT Testing not performed    Strength:  Shoulder shrug (C5):5/5  Deltoid (C5): 5/5  Bicep (C6):5/5  Wrist Extension (C6): 5/5  Tricep (C7):5/5  Wrist Flexion (C7): 5/5  Finger Flexion (C8/T1):5/5    Radiology:  Recent Results (from the past 744 hour(s))   CT Head w/o Contrast    Narrative    CT SCAN OF THE HEAD WITHOUT CONTRAST   2019 2:35 PM     HISTORY: Syncope versus seizure, posterior head trauma and pain.    TECHNIQUE: Axial images of the head and coronal reformations without  IV contrast material. Radiation dose for this scan was reduced using  automated exposure control, adjustment of the mA and/or kV according  to patient size, or iterative reconstruction technique.    COMPARISON: None.    FINDINGS: There is some extracalvarial soft tissue swelling over the  left lateral anterior parietal region. There is no evidence for any  underlying fracture or any intracranial hemorrhage. Ventricles and  subarachnoid spaces are normal. Brain parenchyma is normal. There is  no evidence for acute infarct or mass effect. Moderate mucosal  thickening is noted in the right maxillary sinus. The mastoid air  cells are clear.      Impression    IMPRESSION: Left lateral anterior parietal scalp soft tissue swelling.  No evidence for any underlying fracture or any intracranial  hemorrhage.    STEPHANIE GOODMAN  MD           ASSESSMENT:     Closed head injury, subsequent encounter  Vertigo  Syncope and collapse    PLAN:    -The differential for the etiology of Laura's syncopal episode is broad but includes dehydration, hypoglycemia, vertigo, cardiac (EKG normal), seizures (did not appear to be post ictal in ER per documentation), etc.  This was discussed extensively in the ER.  Head CT scan was negative for intracranial pathology.  She hit her head on the fall and required 5 sutures.  She was sent to me for evaluation of concussion.  In general, she does not feel much different now as compared to prior to the event, but she does note a headache and worsened vertigo.  She was supposed to go to physical therapy previously for her vertigo but her symptoms improved so she did not choose to go.  Her vertigo is worse now.  Explained to Laura that given all of this and that it is a subjective diagnosis, it is difficult to determine if this is truly a concussion.  However, will treat as such for now:  -No activities with a risk of falling or aggressive physical activity.  -Limit multimedia activity (i.e.texting, video games, computer work, and TV)  -No work at this time.  Letter provided.  -No driving.    -Physical therapy referral for vertigo.  Please do 5-6 days of exercises per week between formal sessions and the home exercises they provide.  -Rest physically and cognitively as much as possible. Avoid things that worsen symptoms.    -Follow Up: 1 week or sooner if symptoms fail to improve or worsen.  Please call with any questions or concerns.     -Laura to follow up with Primary Care provider regarding elevated blood pressure.      Elenita Crystal MD, [facility] Sports Medicine  [facility]

## 2019-07-01 NOTE — PATIENT INSTRUCTIONS
-No activities with a risk of falling or aggressive physical activity.  -Limit multimedia activity (i.e.texting, video games, computer work, and TV)  -Rest physically and cognitively as much as possible. Avoid things that worsen symptoms.  -No work at this time.  Letter provided.  -No driving.    -Physical therapy referral.  Please do 5-6 days of exercises per week between formal sessions and the home exercises they provide.    -Follow Up: 1 week or sooner if symptoms fail to improve or worsen.  Please call with any questions or concerns.     -Laura to follow up with Primary Care provider regarding elevated blood pressure.

## 2019-07-04 ENCOUNTER — NURSE TRIAGE (OUTPATIENT)
Dept: NURSING | Facility: CLINIC | Age: 39
End: 2019-07-04

## 2019-07-04 NOTE — TELEPHONE ENCOUNTER
Pt calls in with question of WHERE she goes to get her sutures removed -     Due to be taken out now - PMD unable to get an appointment     Advised pt to go back to the Emergency Dept where she had them placed - ( actually to call first to see if they will do )    Often at Triage they can simply remove sutures    Or plan B > Urgent Care tomorrow if ED wont take out for her    Protocol and care advice reviewed  Caller states understanding of the recommended disposition  Advised to call back if further questions or concerns    Remy Saldivar RN / [facility]      Additional Information    Negative: [1] Major abdominal surgical wound AND [2] visible internal organs    Negative: Sounds like a life-threatening emergency to the triager    Negative: [1] Suture (or staple) came out early AND [2] wound gaping AND [3] < 48 hours since sutures placed    Negative: Patient sounds very sick or weak to the triager    Negative: [1] Wound gaping open AND [2] length of opening > 1/2 inch (6 mm) AND [3] > 48 hours since sutures placed    Negative: [1] Wound gaping open AND [2] on the face AND [3] > 48 hours since sutures placed    Negative: Suture or staple removal is overdue    Negative: [1] Suture (or staple) came out early AND [2] > 48 hours since sutures placed AND [3] caller wants wound checked    Negative: [1] Numbness extends beyond the wound edges AND [2] lasts > 8 hours    Suture (or staple) removal date, questions about    Negative: [1] Bleeding from wound AND [2] won't stop after 10 minutes of direct pressure    Negative: [1] Suture (or staple) came out early AND [2] > 48 hours since sutures placed    Negative: Care of sutured (or stapled) wound,  questions about    Negative: Wound care after sutures (or staples) removed, questions about    Protocols used: SUTURE OR STAPLE [id number]

## 2019-12-17 ENCOUNTER — TELEPHONE (OUTPATIENT)
Dept: FAMILY MEDICINE | Facility: OTHER | Age: 39
End: 2019-12-17

## 2019-12-17 NOTE — TELEPHONE ENCOUNTER
Please abstract the following data from this visit with this patient into the appropriate field in Epic:    Tests that can be patient reported without a hard copy:    Pap smear done on this date: 05/04/2018 (approximately), by this group: [facility], results in care everywhere.    HPV co-testing 05/04/2018 and was negative in care everywhere     Other Tests found in the patient's chart through Chart Review/Care Everywhere:        Note to [employer]straction: If this section is blank, no results were found via Chart Review/Care Everywhere.        Panel Management Review      Patient has the following on her problem list: None      Composite cancer screening  Chart review shows that this patient is due/due soon for the following Pap Smear  Summary:    Patient is due/failing the following:   PAP and PHYSICAL    Action needed:   Patient needs office visit for Physical and PAP.    Type of outreach:    none per care everywhere UTD    Questions for provider review:    None                                                                                                                                    Destini Hernandez CMA       Chart routed to Care Team .

## 2021-07-05 ENCOUNTER — NURSE TRIAGE (OUTPATIENT)
Dept: NURSING | Facility: CLINIC | Age: 41
End: 2021-07-05

## 2021-07-05 NOTE — TELEPHONE ENCOUNTER
"Pt is calling.    Was seen 2 weeks ago for ankle swelling. BP was 212/112 at that time. She was put on blood pressure medication.   Today her BP is  92/53 and 88/53. Denies feeling dizzy, light headed, or weak. Asymptomatic.  Pt is seen through [facility]. Not a [facility] patient.   On Lisinopril and amlodipine.   I advised her to contact her physician.  Care advice reviewed. When to call back reviewd per care advice. I advised her that per our protocol, she should be seen in urgent care now. I advised her that the other option would be to contact her PCP through [facility] and speak with an on call physician, for further advice.  She verbalized understanding.    Reason for Disposition    [1] Systolic BP < 90 AND [2] NOT dizzy, lightheaded or weak    Additional Information    Negative: Started suddenly after an allergic medicine, an allergic food, or bee sting    Negative: Shock suspected (e.g., cold/pale/clammy skin, too weak to stand, low BP, rapid pulse)    Negative: Difficult to awaken or acting confused (e.g., disoriented, slurred speech)    Negative: Fainted    Negative: [1] Systolic BP < 90 AND [2] dizzy, lightheaded, or weak    Negative: Chest pain    Negative: Bleeding (e.g., vomiting blood, rectal bleeding or tarry stools, severe vaginal bleeding)(Exception: fainted from sight of small amount of blood; small cut or abrasion)    Negative: Extra heart beats or heart is beating fast  (i.e., \"palpitations\")    Negative: Sounds like a life-threatening emergency to the triager    Negative: [1] Systolic BP < 80 AND [2] NOT dizzy, lightheaded or weak    Negative: Abdominal pain    Negative: Fever > 100.4 F (38.0 C)    Negative: Major surgery in the past month    Negative: [1] Drinking very little AND [2] dehydration suspected (e.g., no urine > 12 hours, very dry mouth, very lightheaded)    Negative: [1] Fall in systolic BP > 20 mm Hg from normal AND [2] dizzy, lightheaded, or weak    Negative: Patient sounds very " sick or weak to the triager    Protocols used: LOW BLOOD PRESSURE-A-    Fern Hinton RN  [facility]  7/5/2021 at 6:02 PM

## 2025-04-07 ENCOUNTER — HOSPITAL ENCOUNTER (EMERGENCY)
Facility: CLINIC | Age: 45
Discharge: HOME | End: 2025-04-07
Payer: COMMERCIAL

## 2025-04-07 VITALS
OXYGEN SATURATION: 96 % | WEIGHT: 148 LBS | TEMPERATURE: 98 F | HEART RATE: 90 BPM | BODY MASS INDEX: 27.23 KG/M2 | SYSTOLIC BLOOD PRESSURE: 133 MMHG | RESPIRATION RATE: 20 BRPM | DIASTOLIC BLOOD PRESSURE: 90 MMHG | HEIGHT: 62 IN

## 2025-04-07 DIAGNOSIS — I10 HYPERTENSION: ICD-10-CM

## 2025-04-07 DIAGNOSIS — E87.6 HYPOKALEMIA: ICD-10-CM

## 2025-04-07 LAB
ALBUMIN SERPL BCG-MCNC: 4.1 G/DL (ref 3.5–5.2)
ALP SERPL-CCNC: 174 U/L (ref 40–150)
ALT SERPL W P-5'-P-CCNC: 75 U/L (ref 0–50)
ANION GAP SERPL CALCULATED.3IONS-SCNC: 15 MMOL/L (ref 7–15)
AST SERPL W P-5'-P-CCNC: 236 U/L (ref 0–45)
ATRIAL RATE - MUSE: 87 BPM
BASOPHILS # BLD AUTO: 0.1 10E3/UL (ref 0–0.2)
BASOPHILS NFR BLD AUTO: 1 %
BILIRUB SERPL-MCNC: 0.9 MG/DL
BUN SERPL-MCNC: 2 MG/DL (ref 6–20)
CALCIUM SERPL-MCNC: 8.8 MG/DL (ref 8.8–10.4)
CHLORIDE SERPL-SCNC: 91 MMOL/L (ref 98–107)
CREAT SERPL-MCNC: 0.47 MG/DL (ref 0.51–0.95)
DIASTOLIC BLOOD PRESSURE - MUSE: NORMAL MMHG
EGFRCR SERPLBLD CKD-EPI 2021: >90 ML/MIN/1.73M2
EOSINOPHIL # BLD AUTO: 0.1 10E3/UL (ref 0–0.7)
EOSINOPHIL NFR BLD AUTO: 2 %
ERYTHROCYTE [DISTWIDTH] IN BLOOD BY AUTOMATED COUNT: 11.1 % (ref 10–15)
GLUCOSE SERPL-MCNC: 97 MG/DL (ref 70–99)
HCO3 SERPL-SCNC: 24 MMOL/L (ref 22–29)
HCT VFR BLD AUTO: 36.2 % (ref 35–47)
HGB BLD-MCNC: 13.4 G/DL (ref 11.7–15.7)
IMM GRANULOCYTES # BLD: 0 10E3/UL
IMM GRANULOCYTES NFR BLD: 0 %
INTERPRETATION ECG - MUSE: NORMAL
LYMPHOCYTES # BLD AUTO: 1.5 10E3/UL (ref 0.8–5.3)
LYMPHOCYTES NFR BLD AUTO: 22 %
MCH RBC QN AUTO: 37.6 PG (ref 26.5–33)
MCHC RBC AUTO-ENTMCNC: 37 G/DL (ref 31.5–36.5)
MCV RBC AUTO: 102 FL (ref 78–100)
MONOCYTES # BLD AUTO: 0.6 10E3/UL (ref 0–1.3)
MONOCYTES NFR BLD AUTO: 9 %
NEUTROPHILS # BLD AUTO: 4.7 10E3/UL (ref 1.6–8.3)
NEUTROPHILS NFR BLD AUTO: 67 %
NRBC # BLD AUTO: 0 10E3/UL
NRBC BLD AUTO-RTO: 0 /100
P AXIS - MUSE: 17 DEGREES
PLATELET # BLD AUTO: 160 10E3/UL (ref 150–450)
POTASSIUM SERPL-SCNC: 3.1 MMOL/L (ref 3.4–5.3)
PR INTERVAL - MUSE: 128 MS
PROT SERPL-MCNC: 7.1 G/DL (ref 6.4–8.3)
QRS DURATION - MUSE: 72 MS
QT - MUSE: 402 MS
QTC - MUSE: 483 MS
R AXIS - MUSE: 13 DEGREES
RBC # BLD AUTO: 3.56 10E6/UL (ref 3.8–5.2)
SODIUM SERPL-SCNC: 130 MMOL/L (ref 135–145)
SYSTOLIC BLOOD PRESSURE - MUSE: NORMAL MMHG
T AXIS - MUSE: 18 DEGREES
TROPONIN T SERPL HS-MCNC: <6 NG/L
VENTRICULAR RATE- MUSE: 87 BPM
WBC # BLD AUTO: 7 10E3/UL (ref 4–11)

## 2025-04-07 PROCEDURE — 93010 ELECTROCARDIOGRAM REPORT: CPT

## 2025-04-07 PROCEDURE — 93005 ELECTROCARDIOGRAM TRACING: CPT

## 2025-04-07 PROCEDURE — 84484 ASSAY OF TROPONIN QUANT: CPT

## 2025-04-07 PROCEDURE — 84155 ASSAY OF PROTEIN SERUM: CPT

## 2025-04-07 PROCEDURE — 250N000011 HC RX IP 250 OP 636

## 2025-04-07 PROCEDURE — 99284 EMERGENCY DEPT VISIT MOD MDM: CPT

## 2025-04-07 PROCEDURE — 85018 HEMOGLOBIN: CPT

## 2025-04-07 PROCEDURE — 250N000013 HC RX MED GY IP 250 OP 250 PS 637

## 2025-04-07 PROCEDURE — 80053 COMPREHEN METABOLIC PANEL: CPT

## 2025-04-07 PROCEDURE — 36415 COLL VENOUS BLD VENIPUNCTURE: CPT

## 2025-04-07 PROCEDURE — 84132 ASSAY OF SERUM POTASSIUM: CPT

## 2025-04-07 PROCEDURE — 85004 AUTOMATED DIFF WBC COUNT: CPT

## 2025-04-07 RX ORDER — POTASSIUM CHLORIDE 20MEQ/15ML
40 LIQUID (ML) ORAL ONCE
Status: COMPLETED | OUTPATIENT
Start: 2025-04-07 | End: 2025-04-07

## 2025-04-07 RX ORDER — ONDANSETRON 4 MG/1
4 TABLET, ORALLY DISINTEGRATING ORAL ONCE
Status: COMPLETED | OUTPATIENT
Start: 2025-04-07 | End: 2025-04-07

## 2025-04-07 RX ADMIN — ONDANSETRON 4 MG: 4 TABLET, ORALLY DISINTEGRATING ORAL at 21:55

## 2025-04-07 RX ADMIN — POTASSIUM CHLORIDE 40 MEQ: 20 SOLUTION ORAL at 22:19

## 2025-04-07 ASSESSMENT — ENCOUNTER SYMPTOMS
MYALGIAS: 0
VOMITING: 0
ABDOMINAL PAIN: 0
DIZZINESS: 0
EYE REDNESS: 0
ARTHRALGIAS: 0
COUGH: 0
APPETITE CHANGE: 0
NECK STIFFNESS: 0
DYSURIA: 0
FATIGUE: 0
HEMATURIA: 0
RHINORRHEA: 0
SORE THROAT: 0
DIARRHEA: 0
CHILLS: 0
SHORTNESS OF BREATH: 0
ACTIVITY CHANGE: 0
FEVER: 0
NAUSEA: 0
HEADACHES: 0

## 2025-04-07 ASSESSMENT — COLUMBIA-SUICIDE SEVERITY RATING SCALE - C-SSRS
6. HAVE YOU EVER DONE ANYTHING, STARTED TO DO ANYTHING, OR PREPARED TO DO ANYTHING TO END YOUR LIFE?: NO
2. HAVE YOU ACTUALLY HAD ANY THOUGHTS OF KILLING YOURSELF IN THE PAST MONTH?: NO
1. IN THE PAST MONTH, HAVE YOU WISHED YOU WERE DEAD OR WISHED YOU COULD GO TO SLEEP AND NOT WAKE UP?: NO

## 2025-04-07 ASSESSMENT — ACTIVITIES OF DAILY LIVING (ADL)
ADLS_ACUITY_SCORE: 41

## 2025-04-08 NOTE — DISCHARGE INSTRUCTIONS
Please go to the lab at the hospital tomorrow to recheck your potassium.  Please follow-up with your primary care provider this evening started on a blood pressure medication.  Only check your blood pressure 1 time a day at the same time.  Rechecking it multiple times a day can give you false readings.  Your labs today are very reassuring.

## 2025-04-08 NOTE — ED PROVIDER NOTES
History     Chief Complaint   Patient presents with    Hypertension     HPI  Laura Parrish is a 45 year old female who presents to the emergency department with concerns for elevated blood pressure.  She states her blood pressures are usually in the 140s but today they have been in the 150s to 170s.  Per chart review she is not currently on a high blood pressure medication but she states that she does have a an appointment with her primary care provider coming up where she will discuss her blood pressure with him.  She denies any chest pain, dizziness, headache, nausea and vomiting.  She does appear very anxious.    Allergies:  Allergies   Allergen Reactions    Amoxicillin     Ciprofloxacin Rash       Problem List:    Patient Active Problem List    Diagnosis Date Noted    Hypothyroidism 07/25/2014     Priority: Medium    CARDIOVASCULAR SCREENING; LDL GOAL LESS THAN 160 10/31/2010     Priority: Medium        Past Medical History:    No past medical history on file.    Past Surgical History:    No past surgical history on file.    Family History:    Family History   Problem Relation Age of Onset    Hypertension Brother        Social History:  Marital Status:   [2]  Social History     Tobacco Use    Smoking status: Former     Current packs/day: 1.00     Types: Cigarettes    Smokeless tobacco: Never   Substance Use Topics    Alcohol use: Yes     Alcohol/week: 4.0 standard drinks of alcohol     Types: 4 Cans of beer per week     Comment: Last use of ETOH evening.     Drug use: No        Medications:    levothyroxine (SYNTHROID/LEVOTHROID) 125 MCG tablet          Review of Systems   Constitutional:  Negative for activity change, appetite change, chills, fatigue and fever.   HENT:  Negative for congestion, rhinorrhea and sore throat.    Eyes:  Negative for redness.   Respiratory:  Negative for cough and shortness of breath.    Cardiovascular:  Negative for chest pain.   Gastrointestinal:  Negative for abdominal  "pain, diarrhea, nausea and vomiting.   Genitourinary:  Negative for dysuria and hematuria.   Musculoskeletal:  Negative for arthralgias, myalgias and neck stiffness.   Skin:  Negative for rash.   Neurological:  Negative for dizziness and headaches.       Physical Exam   BP: (!) 184/101  Pulse: 89  Temp: 98  F (36.7  C)  Resp: 20  Height: 157.5 cm (5' 2\")  Weight: 67.1 kg (148 lb)  SpO2: 97 %      Physical Exam  Constitutional:       General: She is not in acute distress.     Appearance: Normal appearance. She is not diaphoretic.   HENT:      Head: Atraumatic.      Mouth/Throat:      Mouth: Mucous membranes are moist.   Eyes:      General: No scleral icterus.     Conjunctiva/sclera: Conjunctivae normal.   Cardiovascular:      Rate and Rhythm: Normal rate.      Heart sounds: Normal heart sounds.   Pulmonary:      Effort: No respiratory distress.      Breath sounds: Normal breath sounds.   Abdominal:      General: Abdomen is flat.   Musculoskeletal:      Cervical back: Neck supple.   Skin:     General: Skin is warm.      Findings: No rash.   Neurological:      Mental Status: She is alert.         ED Course         ED Course as of 04/07/25 2206   Mon Apr 07, 2025 2136 Potassium(!): 3.1     Procedures              EKG Interpretation:      Interpreted by YON Abarca CNP  Time reviewed: 2100  Symptoms at time of EKG: HTN   Rhythm: normal sinus   Rate: normal  Axis: normal  Ectopy: none  Conduction: normal  ST Segments/ T Waves: No ST-T wave changes  Q Waves: none  Comparison to prior: No old EKG available    Clinical Impression: normal EKG             Results for orders placed or performed during the hospital encounter of 04/07/25 (from the past 24 hours)   EKG 12-lead, tracing only   Result Value Ref Range    Systolic Blood Pressure  mmHg    Diastolic Blood Pressure  mmHg    Ventricular Rate 87 BPM    Atrial Rate 87 BPM    KS Interval 128 ms    QRS Duration 72 ms     ms    QTc 483 ms    P Axis 17 degrees "    R AXIS 13 degrees    T Axis 18 degrees    Interpretation ECG       Sinus rhythm  Low voltage QRS  Junctional ST depression, probably normal  QTcB >= 480 msec  Abnormal ECG  No previous ECGs available  Confirmed by SEE ED PROVIDER NOTE FOR, ECG INTERPRETATION ([id number]),  Sherri Child ([id number]) on 4/7/2025 9:04:59 PM     CBC with platelets differential    Narrative    The following orders were created for panel order CBC with platelets differential.  Procedure                               Abnormality         Status                     ---------                               -----------         ------                     CBC with platelets and ...[[id number]]  Abnormal            Final result                 Please view results for these tests on the individual orders.   Comprehensive metabolic panel   Result Value Ref Range    Sodium 130 (L) 135 - 145 mmol/L    Potassium 3.1 (L) 3.4 - 5.3 mmol/L    Carbon Dioxide (CO2) 24 22 - 29 mmol/L    Anion Gap 15 7 - 15 mmol/L    Urea Nitrogen 2.0 (L) 6.0 - 20.0 mg/dL    Creatinine 0.47 (L) 0.51 - 0.95 mg/dL    GFR Estimate >90 >60 mL/min/1.73m2    Calcium 8.8 8.8 - 10.4 mg/dL    Chloride 91 (L) 98 - 107 mmol/L    Glucose 97 70 - 99 mg/dL    Alkaline Phosphatase 174 (H) 40 - 150 U/L     (H) 0 - 45 U/L    ALT 75 (H) 0 - 50 U/L    Protein Total 7.1 6.4 - 8.3 g/dL    Albumin 4.1 3.5 - 5.2 g/dL    Bilirubin Total 0.9 <=1.2 mg/dL   Troponin T, High Sensitivity   Result Value Ref Range    Troponin T, High Sensitivity <6 <=14 ng/L   CBC with platelets and differential   Result Value Ref Range    WBC Count 7.0 4.0 - 11.0 10e3/uL    RBC Count 3.56 (L) 3.80 - 5.20 10e6/uL    Hemoglobin 13.4 11.7 - 15.7 g/dL    Hematocrit 36.2 35.0 - 47.0 %     (H) 78 - 100 fL    MCH 37.6 (H) 26.5 - 33.0 pg    MCHC 37.0 (H) 31.5 - 36.5 g/dL    RDW 11.1 10.0 - 15.0 %    Platelet Count 160 150 - 450 10e3/uL    % Neutrophils 67 %    % Lymphocytes 22 %    % Monocytes 9 %    % Eosinophils  2 %    % Basophils 1 %    % Immature Granulocytes 0 %    NRBCs per 100 WBC 0 <1 /100    Absolute Neutrophils 4.7 1.6 - 8.3 10e3/uL    Absolute Lymphocytes 1.5 0.8 - 5.3 10e3/uL    Absolute Monocytes 0.6 0.0 - 1.3 10e3/uL    Absolute Eosinophils 0.1 0.0 - 0.7 10e3/uL    Absolute Basophils 0.1 0.0 - 0.2 10e3/uL    Absolute Immature Granulocytes 0.0 <=0.4 10e3/uL    Absolute NRBCs 0.0 10e3/uL       Medications   potassium chloride (KAYCIEL) solution 40 mEq (has no administration in time range)   ondansetron (ZOFRAN ODT) ODT tab 4 mg (4 mg Oral $Given 4/7/25 2155)       Assessments & Plan (with Medical Decision Making)  Patient presents to the emergency department complaining of high blood pressure. Patient is otherwise asymptomatic without confusion, chest pain, dysuria, vision changes, focal neurological deficit or SOB. Patient is hypertensive here. Patient does not currently take hypertensive medications. Doubt hypertenstive emergency, patient with no signs of AMS, pulmonary edema, heart failure, ACS, PRESS syndrome, intracranial hemorrhage, renal infarction or failure or other end organ damage.  Troponin today is less than 6, CBC is unremarkable.  CMP showed a potassium of 3.1 I did replace it today with 40 mEq of oral potassium, patient did not want to wait for hours for redraw so I did place an order for a redraw tomorrow in clinic.  Plan to discharge patient home with PMD follow up.     I have reviewed the nursing notes.    I have reviewed the findings, diagnosis, plan and need for follow up with the patient.        New Prescriptions    No medications on file       Final diagnoses:   Hypertension   Hypokalemia       4/7/2025   [facility] EMERGENCY DEPT       Bell Villagomez, YON CNP  04/07/25 [id number]

## 2025-08-08 ENCOUNTER — HOSPITAL ENCOUNTER (EMERGENCY)
Facility: CLINIC | Age: 45
Discharge: HOME | End: 2025-08-08
Attending: STUDENT IN AN ORGANIZED HEALTH CARE EDUCATION/TRAINING PROGRAM | Admitting: STUDENT IN AN ORGANIZED HEALTH CARE EDUCATION/TRAINING PROGRAM
Payer: COMMERCIAL

## 2025-08-08 VITALS
HEART RATE: 86 BPM | DIASTOLIC BLOOD PRESSURE: 92 MMHG | SYSTOLIC BLOOD PRESSURE: 131 MMHG | RESPIRATION RATE: 14 BRPM | OXYGEN SATURATION: 97 % | BODY MASS INDEX: 25.61 KG/M2 | WEIGHT: 140 LBS | TEMPERATURE: 97.8 F

## 2025-08-08 DIAGNOSIS — F10.230 ALCOHOL DEPENDENCE WITH UNCOMPLICATED WITHDRAWAL (H): ICD-10-CM

## 2025-08-08 DIAGNOSIS — R19.7 DIARRHEA, UNSPECIFIED TYPE: Primary | ICD-10-CM

## 2025-08-08 LAB
ALBUMIN SERPL BCG-MCNC: 4 G/DL (ref 3.5–5.2)
ALP SERPL-CCNC: 116 U/L (ref 40–150)
ALT SERPL W P-5'-P-CCNC: 37 U/L (ref 0–50)
ANION GAP SERPL CALCULATED.3IONS-SCNC: 17 MMOL/L (ref 7–15)
AST SERPL W P-5'-P-CCNC: 121 U/L (ref 0–45)
BASOPHILS # BLD AUTO: 0.1 10E3/UL (ref 0–0.2)
BASOPHILS NFR BLD AUTO: 1 %
BILIRUB SERPL-MCNC: 0.7 MG/DL
BUN SERPL-MCNC: 5.7 MG/DL (ref 6–20)
CALCIUM SERPL-MCNC: 8.8 MG/DL (ref 8.8–10.4)
CHLORIDE SERPL-SCNC: 94 MMOL/L (ref 98–107)
CREAT SERPL-MCNC: 0.67 MG/DL (ref 0.51–0.95)
EGFRCR SERPLBLD CKD-EPI 2021: >90 ML/MIN/1.73M2
EOSINOPHIL # BLD AUTO: 0.2 10E3/UL (ref 0–0.7)
EOSINOPHIL NFR BLD AUTO: 2 %
ERYTHROCYTE [DISTWIDTH] IN BLOOD BY AUTOMATED COUNT: 11.3 % (ref 10–15)
ETHANOL SERPL-MCNC: <0.01 G/DL
GLUCOSE SERPL-MCNC: 103 MG/DL (ref 70–99)
HCO3 SERPL-SCNC: 22 MMOL/L (ref 22–29)
HCT VFR BLD AUTO: 40 % (ref 35–47)
HGB BLD-MCNC: 14.9 G/DL (ref 11.7–15.7)
IMM GRANULOCYTES # BLD: 0 10E3/UL
IMM GRANULOCYTES NFR BLD: 0 %
LIPASE SERPL-CCNC: 45 U/L (ref 13–60)
LYMPHOCYTES # BLD AUTO: 1.8 10E3/UL (ref 0.8–5.3)
LYMPHOCYTES NFR BLD AUTO: 20 %
MCH RBC QN AUTO: 35.9 PG (ref 26.5–33)
MCHC RBC AUTO-ENTMCNC: 37.3 G/DL (ref 31.5–36.5)
MCV RBC AUTO: 96 FL (ref 78–100)
MONOCYTES # BLD AUTO: 1.1 10E3/UL (ref 0–1.3)
MONOCYTES NFR BLD AUTO: 12 %
NEUTROPHILS # BLD AUTO: 5.6 10E3/UL (ref 1.6–8.3)
NEUTROPHILS NFR BLD AUTO: 64 %
NRBC # BLD AUTO: 0 10E3/UL
NRBC BLD AUTO-RTO: 0 /100
PLATELET # BLD AUTO: 187 10E3/UL (ref 150–450)
POTASSIUM SERPL-SCNC: 3.4 MMOL/L (ref 3.4–5.3)
PROT SERPL-MCNC: 7.4 G/DL (ref 6.4–8.3)
RBC # BLD AUTO: 4.15 10E6/UL (ref 3.8–5.2)
SODIUM SERPL-SCNC: 133 MMOL/L (ref 135–145)
WBC # BLD AUTO: 8.7 10E3/UL (ref 4–11)

## 2025-08-08 PROCEDURE — 80053 COMPREHEN METABOLIC PANEL: CPT | Performed by: STUDENT IN AN ORGANIZED HEALTH CARE EDUCATION/TRAINING PROGRAM

## 2025-08-08 PROCEDURE — 36415 COLL VENOUS BLD VENIPUNCTURE: CPT | Performed by: STUDENT IN AN ORGANIZED HEALTH CARE EDUCATION/TRAINING PROGRAM

## 2025-08-08 PROCEDURE — 96360 HYDRATION IV INFUSION INIT: CPT | Performed by: STUDENT IN AN ORGANIZED HEALTH CARE EDUCATION/TRAINING PROGRAM

## 2025-08-08 PROCEDURE — 99283 EMERGENCY DEPT VISIT LOW MDM: CPT | Performed by: STUDENT IN AN ORGANIZED HEALTH CARE EDUCATION/TRAINING PROGRAM

## 2025-08-08 PROCEDURE — 85004 AUTOMATED DIFF WBC COUNT: CPT | Performed by: STUDENT IN AN ORGANIZED HEALTH CARE EDUCATION/TRAINING PROGRAM

## 2025-08-08 PROCEDURE — 82077 ASSAY SPEC XCP UR&BREATH IA: CPT | Performed by: STUDENT IN AN ORGANIZED HEALTH CARE EDUCATION/TRAINING PROGRAM

## 2025-08-08 PROCEDURE — 99284 EMERGENCY DEPT VISIT MOD MDM: CPT | Performed by: STUDENT IN AN ORGANIZED HEALTH CARE EDUCATION/TRAINING PROGRAM

## 2025-08-08 PROCEDURE — 258N000003 HC RX IP 258 OP 636: Performed by: STUDENT IN AN ORGANIZED HEALTH CARE EDUCATION/TRAINING PROGRAM

## 2025-08-08 PROCEDURE — 83690 ASSAY OF LIPASE: CPT | Performed by: STUDENT IN AN ORGANIZED HEALTH CARE EDUCATION/TRAINING PROGRAM

## 2025-08-08 RX ADMIN — SODIUM CHLORIDE 1000 ML: 0.9 INJECTION, SOLUTION INTRAVENOUS at 08:00

## 2025-08-08 ASSESSMENT — ACTIVITIES OF DAILY LIVING (ADL)
ADLS_ACUITY_SCORE: 41
ADLS_ACUITY_SCORE: 41

## 2025-08-08 ASSESSMENT — COLUMBIA-SUICIDE SEVERITY RATING SCALE - C-SSRS
2. HAVE YOU ACTUALLY HAD ANY THOUGHTS OF KILLING YOURSELF IN THE PAST MONTH?: NO
6. HAVE YOU EVER DONE ANYTHING, STARTED TO DO ANYTHING, OR PREPARED TO DO ANYTHING TO END YOUR LIFE?: NO
1. IN THE PAST MONTH, HAVE YOU WISHED YOU WERE DEAD OR WISHED YOU COULD GO TO SLEEP AND NOT WAKE UP?: NO

## 2025-08-08 ASSESSMENT — ENCOUNTER SYMPTOMS: DIARRHEA: 1

## 2025-08-13 ENCOUNTER — HOSPITAL ENCOUNTER (EMERGENCY)
Facility: CLINIC | Age: 45
Discharge: HOME | End: 2025-08-13
Attending: EMERGENCY MEDICINE
Payer: COMMERCIAL

## 2025-08-13 VITALS
SYSTOLIC BLOOD PRESSURE: 134 MMHG | HEIGHT: 62 IN | OXYGEN SATURATION: 100 % | WEIGHT: 140 LBS | BODY MASS INDEX: 25.76 KG/M2 | RESPIRATION RATE: 16 BRPM | DIASTOLIC BLOOD PRESSURE: 90 MMHG | HEART RATE: 91 BPM | TEMPERATURE: 98.2 F

## 2025-08-13 DIAGNOSIS — A09 DIARRHEA OF INFECTIOUS ORIGIN: ICD-10-CM

## 2025-08-13 DIAGNOSIS — A07.4 INFECTION DUE TO CYCLOSPORA SPECIES: Primary | ICD-10-CM

## 2025-08-13 LAB
ALBUMIN SERPL BCG-MCNC: 4 G/DL (ref 3.5–5.2)
ALP SERPL-CCNC: 137 U/L (ref 40–150)
ALT SERPL W P-5'-P-CCNC: 49 U/L (ref 0–50)
ANION GAP SERPL CALCULATED.3IONS-SCNC: 18 MMOL/L (ref 7–15)
AST SERPL W P-5'-P-CCNC: 129 U/L (ref 0–45)
BASOPHILS # BLD AUTO: 0.1 10E3/UL (ref 0–0.2)
BASOPHILS NFR BLD AUTO: 1.2 %
BILIRUB SERPL-MCNC: 0.7 MG/DL
BUN SERPL-MCNC: 4.4 MG/DL (ref 6–20)
C CAYETANENSIS DNA STL QL NAA+NON-PROBE: POSITIVE
CALCIUM SERPL-MCNC: 9.2 MG/DL (ref 8.8–10.4)
CAMPYLOBACTER DNA SPEC NAA+PROBE: NEGATIVE
CHLORIDE SERPL-SCNC: 91 MMOL/L (ref 98–107)
CREAT SERPL-MCNC: 0.62 MG/DL (ref 0.51–0.95)
CRYPTOSP DNA STL QL NAA+NON-PROBE: NEGATIVE
EC STX1+STX2 GENES STL QL NAA+NON-PROBE: NEGATIVE
EGFRCR SERPLBLD CKD-EPI 2021: >90 ML/MIN/1.73M2
EOSINOPHIL # BLD AUTO: 0.14 10E3/UL (ref 0–0.7)
EOSINOPHIL NFR BLD AUTO: 1.7 %
ERYTHROCYTE [DISTWIDTH] IN BLOOD BY AUTOMATED COUNT: 11.4 % (ref 10–15)
G LAMBLIA DNA STL QL NAA+NON-PROBE: NEGATIVE
GLUCOSE SERPL-MCNC: 90 MG/DL (ref 70–99)
HCO3 SERPL-SCNC: 25 MMOL/L (ref 22–29)
HCT VFR BLD AUTO: 41 % (ref 35–47)
HGB BLD-MCNC: 15.2 G/DL (ref 11.7–15.7)
IMM GRANULOCYTES # BLD: 0.03 10E3/UL
IMM GRANULOCYTES NFR BLD: 0.4 %
LYMPHOCYTES # BLD AUTO: 1.85 10E3/UL (ref 0.8–5.3)
LYMPHOCYTES NFR BLD AUTO: 22.2 %
MCH RBC QN AUTO: 35.8 PG (ref 26.5–33)
MCHC RBC AUTO-ENTMCNC: 37.1 G/DL (ref 31.5–36.5)
MCV RBC AUTO: 96.7 FL (ref 78–100)
MONOCYTES # BLD AUTO: 0.89 10E3/UL (ref 0–1.3)
MONOCYTES NFR BLD AUTO: 10.7 %
NEUTROPHILS # BLD AUTO: 5.31 10E3/UL (ref 1.6–8.3)
NEUTROPHILS NFR BLD AUTO: 63.8 %
NOROVIRUS GI+II RNA STL QL NAA+NON-PROBE: NEGATIVE
NRBC # BLD AUTO: <0.03 10E3/UL
NRBC BLD AUTO-RTO: 0 /100
PLATELET # BLD AUTO: 214 10E3/UL (ref 150–450)
POTASSIUM SERPL-SCNC: 3.4 MMOL/L (ref 3.4–5.3)
PROT SERPL-MCNC: 7.4 G/DL (ref 6.4–8.3)
RBC # BLD AUTO: 4.24 10E6/UL (ref 3.8–5.2)
SALMONELLA SP RPOD STL QL NAA+PROBE: NEGATIVE
SHIGELLA SP+EIEC IPAH ST NAA+NON-PROBE: NEGATIVE
SODIUM SERPL-SCNC: 134 MMOL/L (ref 135–145)
VIBRIO DNA SPEC NAA+PROBE: NEGATIVE
WBC # BLD AUTO: 8.32 10E3/UL (ref 4–11)
Y ENTEROCOL DNA STL QL NAA+PROBE: NEGATIVE

## 2025-08-13 PROCEDURE — 99284 EMERGENCY DEPT VISIT MOD MDM: CPT | Performed by: EMERGENCY MEDICINE

## 2025-08-13 PROCEDURE — 96374 THER/PROPH/DIAG INJ IV PUSH: CPT

## 2025-08-13 PROCEDURE — 85004 AUTOMATED DIFF WBC COUNT: CPT | Performed by: EMERGENCY MEDICINE

## 2025-08-13 PROCEDURE — 96361 HYDRATE IV INFUSION ADD-ON: CPT

## 2025-08-13 PROCEDURE — 250N000011 HC RX IP 250 OP 636: Performed by: EMERGENCY MEDICINE

## 2025-08-13 PROCEDURE — 99284 EMERGENCY DEPT VISIT MOD MDM: CPT | Mod: 25 | Performed by: EMERGENCY MEDICINE

## 2025-08-13 PROCEDURE — 36415 COLL VENOUS BLD VENIPUNCTURE: CPT | Performed by: EMERGENCY MEDICINE

## 2025-08-13 PROCEDURE — 87506 IADNA-DNA/RNA PROBE TQ 6-11: CPT | Performed by: EMERGENCY MEDICINE

## 2025-08-13 PROCEDURE — 82947 ASSAY GLUCOSE BLOOD QUANT: CPT | Performed by: EMERGENCY MEDICINE

## 2025-08-13 PROCEDURE — 258N000003 HC RX IP 258 OP 636: Performed by: EMERGENCY MEDICINE

## 2025-08-13 RX ORDER — VALACYCLOVIR HYDROCHLORIDE 500 MG/1
TABLET, FILM COATED ORAL
COMMUNITY

## 2025-08-13 RX ORDER — LEVOTHYROXINE SODIUM 150 UG/1
150 TABLET ORAL
COMMUNITY
Start: 2025-07-12

## 2025-08-13 RX ORDER — ONDANSETRON 2 MG/ML
4 INJECTION INTRAMUSCULAR; INTRAVENOUS EVERY 30 MIN PRN
Status: DISCONTINUED | OUTPATIENT
Start: 2025-08-13 | End: 2025-08-13 | Stop reason: HOSPADM

## 2025-08-13 RX ORDER — PROCHLORPERAZINE MALEATE 10 MG
10 TABLET ORAL EVERY 6 HOURS PRN
Qty: 20 TABLET | Refills: 0 | Status: SHIPPED | OUTPATIENT
Start: 2025-08-13

## 2025-08-13 RX ORDER — SULFAMETHOXAZOLE AND TRIMETHOPRIM 800; 160 MG/1; MG/1
1 TABLET ORAL 2 TIMES DAILY
Qty: 20 TABLET | Refills: 0 | Status: SHIPPED | OUTPATIENT
Start: 2025-08-13 | End: 2025-08-23

## 2025-08-13 RX ORDER — POTASSIUM CITRATE 540 MG/1
TABLET, EXTENDED RELEASE ORAL
COMMUNITY
Start: 2025-08-07

## 2025-08-13 RX ORDER — ONDANSETRON 4 MG/1
4 TABLET, ORALLY DISINTEGRATING ORAL EVERY 6 HOURS PRN
COMMUNITY

## 2025-08-13 RX ORDER — LISINOPRIL AND HYDROCHLOROTHIAZIDE 20; 25 MG/1; MG/1
1 TABLET ORAL DAILY
COMMUNITY

## 2025-08-13 RX ADMIN — ONDANSETRON 4 MG: 2 INJECTION INTRAMUSCULAR; INTRAVENOUS at 13:05

## 2025-08-13 RX ADMIN — SODIUM CHLORIDE 1000 ML: 0.9 INJECTION, SOLUTION INTRAVENOUS at 13:04

## 2025-08-13 ASSESSMENT — ACTIVITIES OF DAILY LIVING (ADL)
ADLS_ACUITY_SCORE: 41
ADLS_ACUITY_SCORE: 41
ADLS_ACUITY_SCORE: 45
ADLS_ACUITY_SCORE: 45

## 2025-08-14 ENCOUNTER — TELEPHONE (OUTPATIENT)
Dept: NURSING | Facility: CLINIC | Age: 45
End: 2025-08-14
Payer: COMMERCIAL